# Patient Record
Sex: MALE | Race: ASIAN | Employment: FULL TIME | URBAN - METROPOLITAN AREA
[De-identification: names, ages, dates, MRNs, and addresses within clinical notes are randomized per-mention and may not be internally consistent; named-entity substitution may affect disease eponyms.]

---

## 2018-07-27 ENCOUNTER — OFFICE VISIT (OUTPATIENT)
Dept: NEUROLOGY | Age: 33
End: 2018-07-27
Payer: COMMERCIAL

## 2018-07-27 VITALS
DIASTOLIC BLOOD PRESSURE: 75 MMHG | BODY MASS INDEX: 24.08 KG/M2 | HEIGHT: 71 IN | HEART RATE: 61 BPM | WEIGHT: 172 LBS | SYSTOLIC BLOOD PRESSURE: 107 MMHG

## 2018-07-27 DIAGNOSIS — G47.50 PARASOMNIA: Primary | ICD-10-CM

## 2018-07-27 PROCEDURE — 99204 OFFICE O/P NEW MOD 45 MIN: CPT | Performed by: PSYCHIATRY & NEUROLOGY

## 2018-07-27 RX ORDER — IMIPRAMINE HCL 25 MG
TABLET ORAL
Qty: 60 TABLET | Refills: 3 | Status: SHIPPED | OUTPATIENT
Start: 2018-07-27 | End: 2018-08-30 | Stop reason: SDUPTHER

## 2018-07-27 ASSESSMENT — ENCOUNTER SYMPTOMS
RESPIRATORY NEGATIVE: 1
GASTROINTESTINAL NEGATIVE: 1
ALLERGIC/IMMUNOLOGIC NEGATIVE: 1
EYES NEGATIVE: 1

## 2018-07-27 NOTE — LETTER
[facility] Neurology Specialist  [facility] [place] [address]  Phone: [phone]  Fax: [phone]    JONE Christensen*        August 8, 2018       Patient: Cat Pope   MR Number: [id number]   YOB: 1985   Date of Visit: 7/27/2018       Dear Dr. Esteban Rascon: Thank you for the request for consultation for Cat Pope . Below are the relevant portions of my assessment and plan of care. Subjective:      Patient ID: Cat Pope is a 34 y.o. male. HPI  The condition is he reports to be having problems with sleep . He will talk and sit up in his sleep engaging his wife in conversation during sleep getting upset with wife if she answers the wrong question . He will on occasion grab his wife or sleep walk . There is no trouble falling asleep . He is going to bed from 10 PM to 12 AM falling asleep within 5 to 10 minutes sleeping to 7 to 7:30 AM .  He typically will sleep straight through having awakening three times per weeks able to fall back asleep . He has history of sleep walking since age 2 to 4 a few times per month . He reports that in highschool there on occasion sleep walking although not as frequent . He reports that sleep walking is sporadic now ranging from two episodes per month to going 2 to 3 months with no sleep walking . Sleep walking will be in the first part of the night . This is more likely to occur when under stress. When he sleep walks he will pace around bedroom . His wife is able to cue him back to bed on occasion getting upset when stimulated . Once per week he will talk and sit up in bed . There is no snoring with no apnea with no choking . There maybe occasional limb jerk as he is falling asleep . During waking day he is rested . There are no headaches with no focal motor ,sensory , bulbar or visual complaints       Past Medical History:   Diagnosis Date    Acid reflux 2003       History reviewed. No pertinent surgical history. Language process and speech normal . No aphasia   Cranial nerve 2 normal acuety and visual fields  Cranial nerve 3, 4 and 6 . Extraocular muscles are intact . Pupils are equal and reactive   Cranial nerve 5 . Normal strength of masseter and temporalis . Intact corneal reflex. Normal facial sensation  Cranial nerve 7 normal exam   Cranial nerve 8. Grossly intact hearing   Cranial nerve 9 and 10. Symmetric palate elevation   Cranial nerve 11 , 5 out of 5 strength   Cranial Nerve 12 midline tongue . No atrophy  Sensation . Normal proprioception . Intact Vibration . Normal pinprick and light touch   Deep Tendon Reflexes normal  Plantar response flexor bilaterally    Assessment:       Diagnosis Orders   1. Parasomnia  CT Head WO Contrast   The patient has parasomnia with sleep walking to undergo Head CT along with trial of tofranil       Plan:      Orders Placed This Encounter   Procedures    CT Head WO Contrast     Standing Status:   Future     Number of Occurrences:   1     Standing Expiration Date:   7/27/2019             If you have questions, please do not hesitate to call me. I look forward to following Ivanna Osman along with you.     Sincerely,        Ines Moran MD    CC providers:  JONE August - [address]: [phone]

## 2018-07-27 NOTE — PROGRESS NOTES
Subjective:      Patient ID: Sharri Longoria is a 34 y.o. male. HPI  The condition is he reports to be having problems with sleep . He will talk and sit up in his sleep engaging his wife in conversation during sleep getting upset with wife if she answers the wrong question . He will on occasion grab his wife or sleep walk . There is no trouble falling asleep . He is going to bed from 10 PM to 12 AM falling asleep within 5 to 10 minutes sleeping to 7 to 7:30 AM .  He typically will sleep straight through having awakening three times per weeks able to fall back asleep . He has history of sleep walking since age 2 to 4 a few times per month . He reports that in highschool there on occasion sleep walking although not as frequent . He reports that sleep walking is sporadic now ranging from two episodes per month to going 2 to 3 months with no sleep walking . Sleep walking will be in the first part of the night . This is more likely to occur when under stress. When he sleep walks he will pace around bedroom . His wife is able to cue him back to bed on occasion getting upset when stimulated . Once per week he will talk and sit up in bed . There is no snoring with no apnea with no choking . There maybe occasional limb jerk as he is falling asleep . During waking day he is rested . There are no headaches with no focal motor ,sensory , bulbar or visual complaints       Past Medical History:   Diagnosis Date    Acid reflux 2003       History reviewed. No pertinent surgical history. History reviewed. No pertinent family history.     Social History     Social History    Marital status: Unknown     Spouse name: N/A    Number of children: N/A    Years of education: N/A     Social History Main Topics    Smoking status: Never Smoker    Smokeless tobacco: Never Used    Alcohol use 1.2 oz/week     2 Cans of beer per week    Drug use: Unknown    Sexual activity: Not Asked     Other Topics Concern    None     Social History

## 2018-08-02 ENCOUNTER — HOSPITAL ENCOUNTER (OUTPATIENT)
Dept: CT IMAGING | Facility: CLINIC | Age: 33
Discharge: HOME | End: 2018-08-04
Payer: COMMERCIAL

## 2018-08-02 DIAGNOSIS — G47.50 PARASOMNIA: ICD-10-CM

## 2018-08-02 PROCEDURE — 70450 CT HEAD/BRAIN W/O DYE: CPT

## 2018-08-08 NOTE — COMMUNICATION BODY
Subjective:      Patient ID: Nohemi Willams is a 34 y.o. male. HPI  The condition is he reports to be having problems with sleep . He will talk and sit up in his sleep engaging his wife in conversation during sleep getting upset with wife if she answers the wrong question . He will on occasion grab his wife or sleep walk . There is no trouble falling asleep . He is going to bed from 10 PM to 12 AM falling asleep within 5 to 10 minutes sleeping to 7 to 7:30 AM .  He typically will sleep straight through having awakening three times per weeks able to fall back asleep . He has history of sleep walking since age 2 to 4 a few times per month . He reports that in highschool there on occasion sleep walking although not as frequent . He reports that sleep walking is sporadic now ranging from two episodes per month to going 2 to 3 months with no sleep walking . Sleep walking will be in the first part of the night . This is more likely to occur when under stress. When he sleep walks he will pace around bedroom . His wife is able to cue him back to bed on occasion getting upset when stimulated . Once per week he will talk and sit up in bed . There is no snoring with no apnea with no choking . There maybe occasional limb jerk as he is falling asleep . During waking day he is rested . There are no headaches with no focal motor ,sensory , bulbar or visual complaints       Past Medical History:   Diagnosis Date    Acid reflux 2003       History reviewed. No pertinent surgical history. History reviewed. No pertinent family history.     Social History     Social History    Marital status: Unknown     Spouse name: N/A    Number of children: N/A    Years of education: N/A     Social History Main Topics    Smoking status: Never Smoker    Smokeless tobacco: Never Used    Alcohol use 1.2 oz/week     2 Cans of beer per week    Drug use: Unknown    Sexual activity: Not Asked     Other Topics Concern    None     Social History

## 2018-08-30 ENCOUNTER — OFFICE VISIT (OUTPATIENT)
Dept: NEUROLOGY | Age: 33
End: 2018-08-30
Payer: COMMERCIAL

## 2018-08-30 VITALS
DIASTOLIC BLOOD PRESSURE: 89 MMHG | HEART RATE: 80 BPM | BODY MASS INDEX: 23.27 KG/M2 | WEIGHT: 166.2 LBS | SYSTOLIC BLOOD PRESSURE: 133 MMHG | HEIGHT: 71 IN

## 2018-08-30 DIAGNOSIS — G47.50 PARASOMNIA: Primary | ICD-10-CM

## 2018-08-30 PROCEDURE — 99214 OFFICE O/P EST MOD 30 MIN: CPT | Performed by: PSYCHIATRY & NEUROLOGY

## 2018-08-30 RX ORDER — IMIPRAMINE HCL 25 MG
TABLET ORAL
Qty: 60 TABLET | Refills: 5 | Status: SHIPPED | OUTPATIENT
Start: 2018-08-30 | End: 2018-10-10 | Stop reason: SDUPTHER

## 2018-08-30 ASSESSMENT — ENCOUNTER SYMPTOMS
EYES NEGATIVE: 1
ALLERGIC/IMMUNOLOGIC NEGATIVE: 1
GASTROINTESTINAL NEGATIVE: 1
RESPIRATORY NEGATIVE: 1

## 2018-08-30 NOTE — LETTER
 Penicillins     Septra [Sulfamethoxazole-Trimethoprim]        Review of Systems   Constitutional: Negative. HENT: Negative. Eyes: Negative. Respiratory: Negative. Cardiovascular: Negative. Gastrointestinal: Negative. Endocrine: Negative. Genitourinary: Negative. Musculoskeletal: Negative. Skin: Negative. Allergic/Immunologic: Negative. Neurological: Negative. Hematological: Negative. Psychiatric/Behavioral: Negative. Objective:   Physical Exam    Vitals:    08/30/18 0851   BP: 133/89   Pulse: 80     weight: 166 lb 3.2 oz (75.4 kg)    Neurological Examination  Constitutional .General exam well groomed   Head/Ears /Nose/Throat: external ear . Normal exam  Neck and thyroid . Normal size. No bruits  Respiratory . Breathsounds clear bilaterally  Cardiovascular: Auscultation of heart with regular rate and rhythm  Musculoskeletal. Muscle bulk and tone normal                                                           Muscle strength 5/5 strength throughout                                                                                No dysmetria or dysdiadokinesis  No tremor   Normal fine motor  Gait normal   Orientation Alert and oriented x 3   Attention and concentration normal  Short term memory normal  Language process and speech normal . No aphasia   Cranial nerve 2 normal acuety and visual fields  Cranial nerve 3, 4 and 6 . Extraocular muscles are intact . Pupils are equal and reactive   Cranial nerve 5 . Normal strength of masseter and temporalis . Intact corneal reflex. Normal facial sensation  Cranial nerve 7 normal exam   Cranial nerve 8. Grossly intact hearing   Cranial nerve 9 and 10. Symmetric palate elevation   Cranial nerve 11 , 5 out of 5 strength   Cranial Nerve 12 midline tongue . No atrophy  Sensation . Normal proprioception . Intact Vibration .  Normal pinprick and light touch   Deep Tendon Reflexes normal  Plantar response flexor bilaterally    Assessment:

## 2018-08-31 NOTE — COMMUNICATION BODY
Subjective:      Patient ID: Francisco Brewer is a 34 y.o. male. HPI    Active problem parasomnia with sleep walking to undergo Head CT along with trial of tofranil . The condition is she reports that sleepwalking is better . There has been no sleep walking episodes although he is talking in his sleep . Sleep talking will be three times per week on occasion sitting up in bed . He is tolerating tofranil well . There is no daytime hungover or fatigue . There are no headaches with no focal motor ,sensory , bulbar or visual complaints  . Significant medications tofranil 50 mg po qhs. Testing Head CT normal , August 2017      Past Medical History:   Diagnosis Date    Acid reflux 2003       History reviewed. No pertinent surgical history. History reviewed. No pertinent family history. Social History     Social History    Marital status:      Spouse name: N/A    Number of children: N/A    Years of education: N/A     Social History Main Topics    Smoking status: Never Smoker    Smokeless tobacco: Never Used    Alcohol use 1.2 oz/week     2 Cans of beer per week    Drug use: Unknown    Sexual activity: Not Asked     Other Topics Concern    None     Social History Narrative    None       Current Outpatient Prescriptions   Medication Sig Dispense Refill    imipramine (TOFRANIL) 25 MG tablet Take  2 po qhs 60 tablet 5    OMEPRAZOLE PO Take 20 mg by mouth daily       No current facility-administered medications for this visit. Allergies   Allergen Reactions    Penicillins     Septra [Sulfamethoxazole-Trimethoprim]        Review of Systems   Constitutional: Negative. HENT: Negative. Eyes: Negative. Respiratory: Negative. Cardiovascular: Negative. Gastrointestinal: Negative. Endocrine: Negative. Genitourinary: Negative. Musculoskeletal: Negative. Skin: Negative. Allergic/Immunologic: Negative. Neurological: Negative. Hematological: Negative.

## 2018-10-10 DIAGNOSIS — G47.50 PARASOMNIA: Primary | ICD-10-CM

## 2018-10-10 RX ORDER — IMIPRAMINE HCL 25 MG
TABLET ORAL
Qty: 135 TABLET | Refills: 0 | Status: SHIPPED | OUTPATIENT
Start: 2018-10-10

## 2019-09-23 ENCOUNTER — OFFICE VISIT (OUTPATIENT)
Dept: PODIATRY | Age: 34
End: 2019-09-23
Payer: COMMERCIAL

## 2019-09-23 VITALS — HEIGHT: 70 IN | BODY MASS INDEX: 24.05 KG/M2 | WEIGHT: 168 LBS

## 2019-09-23 DIAGNOSIS — M84.374A STRESS FRACTURE, RIGHT FOOT, INITIAL ENCOUNTER FOR FRACTURE: ICD-10-CM

## 2019-09-23 DIAGNOSIS — M79.671 RIGHT FOOT PAIN: Primary | ICD-10-CM

## 2019-09-23 PROCEDURE — 99203 OFFICE O/P NEW LOW 30 MIN: CPT | Performed by: PODIATRIST

## 2019-09-23 NOTE — PROGRESS NOTES
[facility] [id number] [address]  Dept: [phone]    NEW PATIENT PROGRESS NOTE  Date of patient's visit: 9/23/2019  Patient's Name:  Dereck Cao YOB: 1985            Patient Care Team:  JONE Hernandez CNP as PCP - General (Nurse Practitioner)        Chief Complaint   Patient presents with    New Patient    Foot Pain     right foot x several years. 2 years seen dr Azucena Gastelum, had x-rays,orthotics         HPI:   Dereck Cao is a 28 y.o. male who presents to the office today complaining of right foot pain. Symptoms began 2 year(s) ago. Patient relates pain is Present. Pain is rated 4 out of 10 and is described as intermittent. Treatments prior to today's visit include: x-rays and orthotics. Currently denies F/C/N/V. Pt's primary care physician is JONE Phan CNP last seen April 2019 patient states he had orthotics made by dr Kalpesh Marx  Pt has tried immobilization, RICE, orthotics x rays and It has not helped her       Allergies   Allergen Reactions    Penicillins     Septra [Sulfamethoxazole-Trimethoprim]        Past Medical History:   Diagnosis Date    Acid reflux 2003       Prior to Admission medications    Medication Sig Start Date End Date Taking? Authorizing Provider   imipramine (TOFRANIL) 25 MG tablet Take  1-2 po qhs 10/10/18   [address] [place] [address], MD   OMEPRAZOLE PO Take 20 mg by mouth daily    Historical Provider, MD       History reviewed. No pertinent surgical history. History reviewed. No pertinent family history. Social History     Tobacco Use    Smoking status: Never Smoker    Smokeless tobacco: Never Used   Substance Use Topics    Alcohol use:  Yes     Alcohol/week: 2.0 standard drinks     Types: 2 Cans of beer per week       Review of Systems    Review of Systems:   History obtained from chart review and the patient  General ROS: negative for - chills, fatigue, fever, night sweats

## 2019-10-16 ENCOUNTER — OFFICE VISIT (OUTPATIENT)
Dept: PODIATRY | Age: 34
End: 2019-10-16
Payer: COMMERCIAL

## 2019-10-16 VITALS — HEIGHT: 70 IN | WEIGHT: 168 LBS | BODY MASS INDEX: 24.05 KG/M2

## 2019-10-16 DIAGNOSIS — M79.671 RIGHT FOOT PAIN: Primary | ICD-10-CM

## 2019-10-16 DIAGNOSIS — M25.80 SESAMOIDITIS: ICD-10-CM

## 2019-10-16 PROCEDURE — 99213 OFFICE O/P EST LOW 20 MIN: CPT | Performed by: PODIATRIST

## 2019-10-16 RX ORDER — CLOTRIMAZOLE AND BETAMETHASONE DIPROPIONATE 10; .5 MG/ML; MG/ML
LOTION TOPICAL
COMMUNITY

## 2019-10-16 RX ORDER — IBUPROFEN 800 MG/1
TABLET ORAL
COMMUNITY

## 2019-10-16 RX ORDER — AZITHROMYCIN 250 MG/1
TABLET, FILM COATED ORAL
COMMUNITY

## 2019-10-16 RX ORDER — LEVOCETIRIZINE DIHYDROCHLORIDE 5 MG/1
TABLET, FILM COATED ORAL
COMMUNITY